# Patient Record
Sex: FEMALE | Race: BLACK OR AFRICAN AMERICAN | NOT HISPANIC OR LATINO | URBAN - METROPOLITAN AREA
[De-identification: names, ages, dates, MRNs, and addresses within clinical notes are randomized per-mention and may not be internally consistent; named-entity substitution may affect disease eponyms.]

---

## 2017-10-16 PROBLEM — E61.1 IRON DEFICIENCY: Status: ACTIVE | Noted: 2017-10-16

## 2017-10-16 PROBLEM — E78.5 HYPERLIPIDEMIA: Status: ACTIVE | Noted: 2017-10-16

## 2017-10-16 PROBLEM — I10 ESSENTIAL HYPERTENSION, BENIGN: Status: ACTIVE | Noted: 2017-10-16

## 2017-10-16 PROBLEM — E03.9 PRIMARY HYPOTHYROIDISM: Status: ACTIVE | Noted: 2017-10-16

## 2017-10-16 PROBLEM — E55.9 VITAMIN D DEFICIENCY DISEASE: Status: ACTIVE | Noted: 2017-10-16

## 2017-10-16 PROBLEM — Z23 NEED FOR PROPHYLACTIC VACCINATION AND INOCULATION AGAINST INFLUENZA: Status: ACTIVE | Noted: 2017-10-16

## 2017-12-05 PROBLEM — I10 ESSENTIAL HYPERTENSION, MALIGNANT: Status: ACTIVE | Noted: 2017-12-05

## 2017-12-05 PROBLEM — H40.9 GLAUCOMA: Status: ACTIVE | Noted: 2017-12-05

## 2018-02-05 PROBLEM — E11.9 DIABETES MELLITUS: Status: ACTIVE | Noted: 2018-02-05

## 2018-02-05 PROBLEM — I10 HYPERTENSION: Status: ACTIVE | Noted: 2018-02-05

## 2018-07-26 PROBLEM — G89.29 CHRONIC PAIN OF BOTH KNEES: Status: ACTIVE | Noted: 2018-07-26

## 2018-07-26 PROBLEM — M25.561 CHRONIC PAIN OF BOTH KNEES: Status: ACTIVE | Noted: 2018-07-26

## 2018-07-26 PROBLEM — M25.562 CHRONIC PAIN OF BOTH KNEES: Status: ACTIVE | Noted: 2018-07-26

## 2018-09-12 PROBLEM — D63.8 ANEMIA OF CHRONIC DISEASE: Status: ACTIVE | Noted: 2018-09-12

## 2018-10-26 PROBLEM — N95.9 MENOPAUSAL PROBLEM: Status: ACTIVE | Noted: 2018-10-26

## 2019-02-27 PROBLEM — M79.604 BILATERAL LEG PAIN: Status: ACTIVE | Noted: 2019-02-27

## 2019-02-27 PROBLEM — E11.9 DIABETES MELLITUS WITHOUT COMPLICATION: Status: ACTIVE | Noted: 2019-02-27

## 2019-02-27 PROBLEM — M79.605 BILATERAL LEG PAIN: Status: ACTIVE | Noted: 2019-02-27

## 2019-08-05 PROBLEM — M77.31 HEEL SPUR, RIGHT: Status: ACTIVE | Noted: 2019-08-05

## 2019-10-03 PROBLEM — E11.22 TYPE 2 DIABETES MELLITUS WITH STAGE 3 CHRONIC KIDNEY DISEASE, WITH LONG-TERM CURRENT USE OF INSULIN: Status: ACTIVE | Noted: 2017-10-16

## 2019-10-03 PROBLEM — E11.9 DIABETES MELLITUS WITHOUT COMPLICATION: Status: RESOLVED | Noted: 2019-02-27 | Resolved: 2019-10-03

## 2019-10-03 PROBLEM — Z79.4 TYPE 2 DIABETES MELLITUS WITH STAGE 3 CHRONIC KIDNEY DISEASE, WITH LONG-TERM CURRENT USE OF INSULIN: Status: ACTIVE | Noted: 2017-10-16

## 2019-10-03 PROBLEM — N18.30 TYPE 2 DIABETES MELLITUS WITH STAGE 3 CHRONIC KIDNEY DISEASE, WITH LONG-TERM CURRENT USE OF INSULIN: Status: ACTIVE | Noted: 2017-10-16

## 2020-06-23 PROBLEM — H40.211: Status: ACTIVE | Noted: 2020-06-23

## 2020-09-24 PROBLEM — I10 ESSENTIAL HYPERTENSION, MALIGNANT: Status: RESOLVED | Noted: 2017-12-05 | Resolved: 2020-09-24

## 2020-09-24 PROBLEM — I10 HYPERTENSION: Status: RESOLVED | Noted: 2018-02-05 | Resolved: 2020-09-24

## 2022-02-10 PROBLEM — M79.10 MYALGIA: Status: ACTIVE | Noted: 2022-02-10

## 2022-02-22 PROBLEM — Z79.4 TYPE 2 DIABETES MELLITUS WITH MODERATE NONPROLIFERATIVE RETINOPATHY, WITH LONG-TERM CURRENT USE OF INSULIN: Status: ACTIVE | Noted: 2022-02-22

## 2022-02-22 PROBLEM — E11.3399 TYPE 2 DIABETES MELLITUS WITH MODERATE NONPROLIFERATIVE RETINOPATHY, WITH LONG-TERM CURRENT USE OF INSULIN: Status: ACTIVE | Noted: 2022-02-22

## 2023-03-20 PROBLEM — Z79.4 TYPE 2 DIABETES MELLITUS WITH HYPERGLYCEMIA, WITH LONG-TERM CURRENT USE OF INSULIN: Status: ACTIVE | Noted: 2023-03-20

## 2023-03-20 PROBLEM — E11.65 TYPE 2 DIABETES MELLITUS WITH HYPERGLYCEMIA, WITH LONG-TERM CURRENT USE OF INSULIN: Status: ACTIVE | Noted: 2023-03-20

## 2023-06-22 PROBLEM — E11.3511 TYPE 2 DIABETES MELLITUS WITH RIGHT EYE AFFECTED BY PROLIFERATIVE RETINOPATHY AND MACULAR EDEMA, WITH LONG-TERM CURRENT USE OF INSULIN: Status: ACTIVE | Noted: 2023-06-22

## 2023-06-22 PROBLEM — Z79.4 TYPE 2 DIABETES MELLITUS WITH RIGHT EYE AFFECTED BY PROLIFERATIVE RETINOPATHY AND MACULAR EDEMA, WITH LONG-TERM CURRENT USE OF INSULIN: Status: ACTIVE | Noted: 2023-06-22

## 2023-06-22 PROBLEM — I27.20 PULMONARY HYPERTENSION: Status: ACTIVE | Noted: 2023-06-22

## 2023-07-05 PROBLEM — K76.0 FATTY LIVER: Status: ACTIVE | Noted: 2023-07-05

## 2023-07-05 PROBLEM — R25.2 MUSCLE CRAMP: Status: ACTIVE | Noted: 2023-07-05

## 2023-07-20 ENCOUNTER — HOSPITAL ENCOUNTER (OUTPATIENT)
Dept: RADIOLOGY | Facility: HOSPITAL | Age: 63
Discharge: HOME | End: 2023-07-20
Attending: INTERNAL MEDICINE
Payer: COMMERCIAL

## 2023-07-20 DIAGNOSIS — N28.89 RENAL MASS, LEFT: ICD-10-CM

## 2023-07-20 PROCEDURE — 74176 CT ABD & PELVIS W/O CONTRAST: CPT | Mod: TC

## 2023-07-20 PROCEDURE — 74176 CT ABDOMEN PELVIS WITHOUT CONTRAST: ICD-10-PCS | Mod: 26,,, | Performed by: RADIOLOGY

## 2023-07-20 PROCEDURE — 74176 CT ABD & PELVIS W/O CONTRAST: CPT | Mod: 26,,, | Performed by: RADIOLOGY

## 2023-07-21 PROBLEM — G72.0 STATIN MYOPATHY: Status: ACTIVE | Noted: 2023-07-21

## 2023-07-21 PROBLEM — T46.6X5A STATIN MYOPATHY: Status: ACTIVE | Noted: 2023-07-21

## 2023-09-06 ENCOUNTER — HOSPITAL ENCOUNTER (OUTPATIENT)
Dept: RADIOLOGY | Facility: HOSPITAL | Age: 63
Discharge: HOME | End: 2023-09-06
Attending: INTERNAL MEDICINE
Payer: COMMERCIAL

## 2023-09-06 DIAGNOSIS — N28.89 RENAL MASS, LEFT: ICD-10-CM

## 2023-09-06 PROCEDURE — 74178 CT ABD&PLV WO CNTR FLWD CNTR: CPT | Mod: 26,,, | Performed by: RADIOLOGY

## 2023-09-06 PROCEDURE — 74178 CT ABDOMEN PELVIS W WO CONTRAST: ICD-10-PCS | Mod: 26,,, | Performed by: RADIOLOGY

## 2023-09-06 PROCEDURE — 74178 CT ABD&PLV WO CNTR FLWD CNTR: CPT | Mod: TC

## 2023-09-06 PROCEDURE — 25500020 PHARM REV CODE 255: Performed by: INTERNAL MEDICINE

## 2023-09-06 RX ADMIN — IOHEXOL 100 ML: 350 INJECTION, SOLUTION INTRAVENOUS at 08:09

## 2024-06-03 PROBLEM — E78.49 OTHER HYPERLIPIDEMIA: Status: ACTIVE | Noted: 2017-10-16

## 2024-09-17 PROBLEM — Z79.4 TYPE 2 DIABETES MELLITUS WITH STAGE 3 CHRONIC KIDNEY DISEASE, WITH LONG-TERM CURRENT USE OF INSULIN: Status: RESOLVED | Noted: 2017-10-16 | Resolved: 2024-09-17

## 2024-09-17 PROBLEM — E11.22 TYPE 2 DIABETES MELLITUS WITH STAGE 3 CHRONIC KIDNEY DISEASE, WITH LONG-TERM CURRENT USE OF INSULIN: Status: RESOLVED | Noted: 2017-10-16 | Resolved: 2024-09-17

## 2024-09-17 PROBLEM — N18.30 TYPE 2 DIABETES MELLITUS WITH STAGE 3 CHRONIC KIDNEY DISEASE, WITH LONG-TERM CURRENT USE OF INSULIN: Status: RESOLVED | Noted: 2017-10-16 | Resolved: 2024-09-17

## 2024-11-14 ENCOUNTER — TELEPHONE (OUTPATIENT)
Dept: PHARMACY | Facility: CLINIC | Age: 64
End: 2024-11-14
Payer: COMMERCIAL

## 2024-11-15 NOTE — TELEPHONE ENCOUNTER
[facility]/Population Health Chart Review & Patient Outreach Details For Medication Adherence Project    Reason for Outreach Encounter: 3rd Party payor non-compliance report ([employer], [employer], [facility], etc)  2.  Patient Outreach Method: Reviewed patient chart   3.   Medication in question: losartan-hydrochlorothiazide 100-12.5 mg   Hypertension Medications               carvediloL (COREG) 6.25 MG tablet Take 1 tablet (6.25 mg total) by mouth 2 (two) times daily.    doxazosin (CARDURA) 4 MG tablet Take 1 tablet (4 mg total) by mouth once daily.    furosemide (LASIX) 20 MG tablet TAKE 1 TABLET BY MOUTH EVERY DAY AS NEEDED    losartan (COZAAR) 50 MG tablet Take 50 mg by mouth once daily.    minoxidiL (LONITEN) 2.5 MG tablet Take 2.5 mg by mouth once daily.                    4.  Reviewed and or Updates Made To: Patient Chart  5. Outreach Outcomes and/or actions taken: Medication discontinued  Additional Notes:     losartan-hydrochlorothiazide 100-12.5 mg (HYZAAR) 100-12.5 mg Tab [[id number]]  DISCONTINUED 9/17/24    Losartan 50 mg tab is a historical rx with no recent dispenses on file